# Patient Record
Sex: MALE | Race: OTHER | HISPANIC OR LATINO | Employment: FULL TIME | URBAN - METROPOLITAN AREA
[De-identification: names, ages, dates, MRNs, and addresses within clinical notes are randomized per-mention and may not be internally consistent; named-entity substitution may affect disease eponyms.]

---

## 2020-12-11 ENCOUNTER — OFFICE VISIT (OUTPATIENT)
Dept: URGENT CARE | Facility: MEDICAL CENTER | Age: 35
End: 2020-12-11
Payer: COMMERCIAL

## 2020-12-11 VITALS — TEMPERATURE: 98.6 F | OXYGEN SATURATION: 99 % | HEART RATE: 66 BPM | RESPIRATION RATE: 18 BRPM

## 2020-12-11 DIAGNOSIS — R43.0 NO SENSE OF SMELL: Primary | ICD-10-CM

## 2020-12-11 PROCEDURE — U0003 INFECTIOUS AGENT DETECTION BY NUCLEIC ACID (DNA OR RNA); SEVERE ACUTE RESPIRATORY SYNDROME CORONAVIRUS 2 (SARS-COV-2) (CORONAVIRUS DISEASE [COVID-19]), AMPLIFIED PROBE TECHNIQUE, MAKING USE OF HIGH THROUGHPUT TECHNOLOGIES AS DESCRIBED BY CMS-2020-01-R: HCPCS | Performed by: PHYSICIAN ASSISTANT

## 2020-12-11 PROCEDURE — 99213 OFFICE O/P EST LOW 20 MIN: CPT | Performed by: PHYSICIAN ASSISTANT

## 2020-12-12 LAB — SARS-COV-2 RNA SPEC QL NAA+PROBE: DETECTED

## 2021-08-18 ENCOUNTER — OFFICE VISIT (OUTPATIENT)
Dept: URGENT CARE | Age: 36
End: 2021-08-18
Payer: COMMERCIAL

## 2021-08-18 VITALS
OXYGEN SATURATION: 99 % | HEART RATE: 80 BPM | RESPIRATION RATE: 17 BRPM | WEIGHT: 270 LBS | BODY MASS INDEX: 35.78 KG/M2 | TEMPERATURE: 97.9 F | HEIGHT: 73 IN

## 2021-08-18 DIAGNOSIS — R19.7 DIARRHEA, UNSPECIFIED TYPE: ICD-10-CM

## 2021-08-18 DIAGNOSIS — R05.9 COUGH: Primary | ICD-10-CM

## 2021-08-18 DIAGNOSIS — R11.2 NON-INTRACTABLE VOMITING WITH NAUSEA, UNSPECIFIED VOMITING TYPE: ICD-10-CM

## 2021-08-18 PROCEDURE — 99213 OFFICE O/P EST LOW 20 MIN: CPT | Performed by: PHYSICIAN ASSISTANT

## 2021-08-18 PROCEDURE — U0005 INFEC AGEN DETEC AMPLI PROBE: HCPCS | Performed by: PHYSICIAN ASSISTANT

## 2021-08-18 PROCEDURE — U0003 INFECTIOUS AGENT DETECTION BY NUCLEIC ACID (DNA OR RNA); SEVERE ACUTE RESPIRATORY SYNDROME CORONAVIRUS 2 (SARS-COV-2) (CORONAVIRUS DISEASE [COVID-19]), AMPLIFIED PROBE TECHNIQUE, MAKING USE OF HIGH THROUGHPUT TECHNOLOGIES AS DESCRIBED BY CMS-2020-01-R: HCPCS | Performed by: PHYSICIAN ASSISTANT

## 2021-08-18 RX ORDER — ONDANSETRON 4 MG/1
4 TABLET, ORALLY DISINTEGRATING ORAL EVERY 6 HOURS PRN
Qty: 20 TABLET | Refills: 0 | Status: SHIPPED | OUTPATIENT
Start: 2021-08-18

## 2021-08-18 NOTE — PATIENT INSTRUCTIONS
COVID testing was obtained today  Isolate until test results are available  You may view your results on MyChart  If you do not have a MyChart, you can create one with the activation code you were given today  If results are negative and feeling well, may resume normal activities  If results are positive, will need to isolate for 10-14 days from onset of symptoms  Any worsening of symptoms especially any difficulty breathing go to the emergency room  Follow-up with your family doctor for further treatment if needed  You may take vitamin-D3 2000 IU daily                          vitamin-C 1 gram every 12 hours                         Multivitamin daily   take Zofran as needed for nausea, take Imodium as needed for diarrhea, any worsening of symptoms go to the ER  Acute Nausea and Vomiting   WHAT YOU NEED TO KNOW:   Acute nausea and vomiting start suddenly, worsen quickly, and last a short time  DISCHARGE INSTRUCTIONS:   Return to the emergency department if:   · You see blood in your vomit or your bowel movements  · You have sudden, severe pain in your chest and upper abdomen after hard vomiting or retching  · You have swelling in your neck and chest      · You are dizzy, cold, and thirsty and your eyes and mouth are dry  · You are urinating very little or not at all  · You have muscle weakness, leg cramps, and trouble breathing  · Your heart is beating much faster than normal      · You continue to vomit for more than 48 hours  Contact your healthcare provider if:   · You have frequent dry heaves (vomiting but nothing comes out)  · Your nausea and vomiting does not get better or go away after you use medicine  · You have questions or concerns about your condition or treatment  Medicines: You may need any of the following:  · Medicines  may be given to calm your stomach and stop your vomiting   You may also need medicines to help you feel more relaxed or to stop nausea and vomiting caused by motion sickness  · Gastrointestinal stimulants  are used to help empty your stomach and bowels  This may help decrease nausea and vomiting  · Take your medicine as directed  Contact your healthcare provider if you think your medicine is not helping or if you have side effects  Tell him or her if you are allergic to any medicine  Keep a list of the medicines, vitamins, and herbs you take  Include the amounts, and when and why you take them  Bring the list or the pill bottles to follow-up visits  Carry your medicine list with you in case of an emergency  Prevent or manage acute nausea and vomiting:   · Do not drink alcohol  Alcohol may upset or irritate your stomach  Too much alcohol can also cause acute nausea and vomiting  · Control stress  Headaches due to stress may cause nausea and vomiting  Find ways to relax and manage your stress  Get more rest and sleep  · Drink more liquids as directed  Vomiting can lead to dehydration  It is important to drink more liquids to help replace lost body fluids  Ask your healthcare provider how much liquid to drink each day and which liquids are best for you  Your provider may recommend that you drink an oral rehydration solution (ORS)  ORS contains water, salts, and sugar that are needed to replace the lost body fluids  Ask what kind of ORS to use, how much to drink, and where to get it  · Eat smaller meals, more often  Eat small amounts of food every 2 to 3 hours, even if you are not hungry  Food in your stomach may decrease your nausea  · Talk to your healthcare provider before you take over-the-counter (OTC) medicines  These medicines can cause serious problems if you use certain other medicines, or you have a medical condition  You may have problems if you use too much or use them for longer than the label says  Follow directions on the label carefully      Follow up with your healthcare provider as directed:  Write down your

## 2021-08-18 NOTE — LETTER
August 18, 2021     Patient: Alexey Houston   YOB: 1985   Date of Visit: 8/18/2021       To Whom It May Concern: It is my medical opinion that Alexey Houston  should remain out of work until test results are available  If you have any questions or concerns, please don't hesitate to call           Sincerely,        Anusha Schneider PA-C    CC: No Recipients

## 2021-08-18 NOTE — PROGRESS NOTES
[facility]        NAME: Dyan Mcintosh is a 37 y o  male  : 1985    MRN: [id number]  DATE: 2021  TIME: 11:08 AM    Assessment and Plan   Cough [R05]  1  Cough  [facility]   2  Diarrhea, unspecified type     3  Non-intractable vomiting with nausea, unspecified vomiting type  ondansetron (ZOFRAN-ODT) 4 mg disintegrating tablet         Patient Instructions     Follow up with PCP in 3-5 days  Proceed to  ER if symptoms worsen  Chief Complaint     Chief Complaint   Patient presents with    Nasal Congestion     x2 days ago, pt started with nausea, diarrhea, cough and nasal congestion  pt is not vaccinated  History of Present Illness         79-year-old male presents for possible COVID symptoms  Patient states on Friday he was working in the heat and became weak and dizzy  He states he sat for a period of time and had water and felt better  After his symptoms subsided, he states he felt better  He denies at the time having any loss of consciousness, headache, chest pain, shortness of breath, heart palpitations  Patient states he was feeling well up until Monday when he developed nasal congestion, diarrhea and yesterday developed nausea and vomiting  Patient states he has not COVID vaccinated  He states he had COVID approximately 5 months ago  He is concerned because he states when he had COVID the last time his symptoms were similar to this  Review of Systems   Review of Systems   Constitutional: Positive for appetite change and fatigue  Negative for activity change, chills, diaphoresis and fever  HENT: Positive for congestion  Negative for ear discharge, ear pain, postnasal drip, rhinorrhea, sinus pressure, sinus pain, sneezing, sore throat and trouble swallowing  Eyes: Negative  Respiratory: Negative  Cardiovascular: Negative  Gastrointestinal: Positive for diarrhea, nausea and vomiting   Negative for abdominal distention, abdominal pain and blood in stool  Skin: Negative  Neurological: Positive for dizziness, weakness and light-headedness  Negative for tremors, seizures, syncope, facial asymmetry, speech difficulty, numbness and headaches  Current Medications       Current Outpatient Medications:     ondansetron (ZOFRAN-ODT) 4 mg disintegrating tablet, Take 1 tablet (4 mg total) by mouth every 6 (six) hours as needed for nausea or vomiting, Disp: 20 tablet, Rfl: 0    Current Allergies     Allergies as of 08/18/2021    (No Known Allergies)            The following portions of the patient's history were reviewed and updated as appropriate: allergies, current medications, past family history, past medical history, past social history, past surgical history and problem list     Objective   Pulse 80   Temp 97 9 °F (36 6 °C) (Tympanic)   Resp 17   Ht 6' 1" (1 854 m)   Wt 122 kg (270 lb)   SpO2 99%   BMI 35 62 kg/m²        Physical Exam     Physical Exam  Vitals and nursing note reviewed  Constitutional:       General: He is not in acute distress  Appearance: He is not ill-appearing  HENT:      Head: Normocephalic and atraumatic  Right Ear: Tympanic membrane, ear canal and external ear normal       Left Ear: Tympanic membrane, ear canal and external ear normal       Nose: Nose normal       Mouth/Throat:      Mouth: Mucous membranes are moist       Pharynx: No posterior oropharyngeal erythema  Eyes:      Conjunctiva/sclera: Conjunctivae normal    Cardiovascular:      Rate and Rhythm: Normal rate and regular rhythm  Pulmonary:      Effort: Pulmonary effort is normal       Breath sounds: Normal breath sounds  Abdominal:      General: Abdomen is protuberant  Bowel sounds are normal       Palpations: Abdomen is soft  Tenderness: There is no abdominal tenderness  There is no guarding or rebound  Lymphadenopathy:      Cervical: No cervical adenopathy     Neurological:      Mental Status: He is alert

## 2021-08-19 LAB — SARS-COV-2 RNA RESP QL NAA+PROBE: NEGATIVE

## 2024-02-14 ENCOUNTER — APPOINTMENT (EMERGENCY)
Dept: RADIOLOGY | Facility: HOSPITAL | Age: 39
End: 2024-02-14

## 2024-02-14 ENCOUNTER — HOSPITAL ENCOUNTER (EMERGENCY)
Facility: HOSPITAL | Age: 39
Discharge: HOME | End: 2024-02-14
Attending: EMERGENCY MEDICINE | Admitting: EMERGENCY MEDICINE

## 2024-02-14 VITALS
BODY MASS INDEX: 41.08 KG/M2 | SYSTOLIC BLOOD PRESSURE: 145 MMHG | WEIGHT: 311.4 LBS | TEMPERATURE: 97.8 F | RESPIRATION RATE: 16 BRPM | HEART RATE: 84 BPM | DIASTOLIC BLOOD PRESSURE: 91 MMHG | OXYGEN SATURATION: 99 %

## 2024-02-14 DIAGNOSIS — M54.9 BACK PAIN: ICD-10-CM
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
## 2024-02-14 DIAGNOSIS — S60.419A ABRASION OF FINGER, INITIAL ENCOUNTER: ICD-10-CM

## 2024-02-14 LAB
BILIRUB UR QL STRIP: NEGATIVE
CLARITY UR: CLEAR
COLOR UR: YELLOW
GLUCOSE UR STRIP-MCNC: NEGATIVE MG/DL
HGB UR QL STRIP.AUTO: NEGATIVE
KETONES UR STRIP-MCNC: NEGATIVE MG/DL
LEUKOCYTE ESTERASE UR QL STRIP: NEGATIVE
NITRITE UR QL STRIP: NEGATIVE
PH UR STRIP.AUTO: 5 [PH]
PROT UR STRIP-MCNC: NEGATIVE MG/DL
SP GR UR STRIP.AUTO: 1.01 (ref 1–1.04)
UROBILINOGEN UA: NEGATIVE MG/DL

## 2024-02-14 PROCEDURE — 90715 TDAP VACCINE 7 YRS/> IM: CPT

## 2024-02-14 PROCEDURE — 90471 IMMUNIZATION ADMIN: CPT

## 2024-02-14 PROCEDURE — 72100 X-RAY EXAM L-S SPINE 2/3 VWS: CPT

## 2024-02-14 PROCEDURE — 99284 EMERGENCY DEPT VISIT MOD MDM: CPT

## 2024-02-14 PROCEDURE — 96372 THER/PROPH/DIAG INJ SC/IM: CPT

## 2024-02-14 RX ORDER — LIDOCAINE 50 MG/G
1 PATCH TOPICAL ONCE
Status: DISCONTINUED | OUTPATIENT
Start: 2024-02-14 | End: 2024-02-14 | Stop reason: HOSPADM

## 2024-02-14 RX ORDER — LIDOCAINE 50 MG/G
1 PATCH TOPICAL EVERY 24 HOURS
Qty: 15 PATCH | Refills: 0 | Status: SHIPPED | OUTPATIENT
Start: 2024-02-14 | End: 2024-02-29

## 2024-02-14 RX ORDER — NAPROXEN 500 MG/1
500 TABLET ORAL 2 TIMES DAILY WITH MEALS
Qty: 10 TABLET | Refills: 0 | Status: SHIPPED | OUTPATIENT
Start: 2024-02-14 | End: 2025-02-13

## 2024-02-14 RX ORDER — KETOROLAC TROMETHAMINE 30 MG/ML
15 INJECTION, SOLUTION INTRAMUSCULAR; INTRAVENOUS ONCE
Status: COMPLETED | OUTPATIENT
Start: 2024-02-14 | End: 2024-02-14

## 2024-02-14 RX ORDER — GINSENG 100 MG
1 CAPSULE ORAL ONCE
Status: COMPLETED | OUTPATIENT
Start: 2024-02-14 | End: 2024-02-14

## 2024-02-14 RX ORDER — METHOCARBAMOL 500 MG/1
500 TABLET, FILM COATED ORAL 2 TIMES DAILY
Qty: 20 TABLET | Refills: 0 | Status: SHIPPED | OUTPATIENT
Start: 2024-02-14

## 2024-02-14 RX ORDER — METHOCARBAMOL 500 MG/1
500 TABLET, FILM COATED ORAL ONCE
Status: COMPLETED | OUTPATIENT
Start: 2024-02-14 | End: 2024-02-14

## 2024-02-14 RX ADMIN — TETANUS TOXOID, REDUCED DIPHTHERIA TOXOID AND ACELLULAR PERTUSSIS VACCINE, ADSORBED 0.5 ML: 5; 2.5; 8; 8; 2.5 SUSPENSION INTRAMUSCULAR at 12:16

## 2024-02-14 RX ADMIN — BACITRACIN 1 SMALL APPLICATION: 500 OINTMENT TOPICAL at 12:16

## 2024-02-14 RX ADMIN — METHOCARBAMOL TABLETS 500 MG: 500 TABLET, COATED ORAL at 12:17

## 2024-02-14 RX ADMIN — LIDOCAINE 1 PATCH: 50 PATCH CUTANEOUS at 12:17

## 2024-02-14 RX ADMIN — KETOROLAC TROMETHAMINE 15 MG: 30 INJECTION, SOLUTION INTRAMUSCULAR; INTRAVENOUS at 12:16

## 2024-02-14 NOTE — Clinical Note
Santhosh Berg was seen and treated in our emergency department on 2/14/2024.                Diagnosis:     Santhosh  .    He may return on this date: 02/17/2024         If you have any questions or concerns, please don't hesitate to call.      Chaya Sanchez PA-C    ______________________________           _______________          _______________  Hospital Representative                              Date                                Time

## 2024-02-14 NOTE — DISCHARGE INSTRUCTIONS
Do not drive or operate heavy machinery while taking robaxin. Follow wound care instructions discussed.  Follow-up with PCP.  Follow-up with comprehensive spine program as needed.  Return to ED for new or worsening symptoms as discussed

## 2024-02-14 NOTE — ED PROVIDER NOTES
History  Chief Complaint   Patient presents with    Fall     Pt slipped down stairs at home, c/o pain to lower back, right leg and right hand.  Several abrasions noted to fingers of right hand.  No head injury, no LOC     38-year-old male past medical history of hypertension presents to emergency department complaining of low back pain after fall.  Was walking out of his apartment when he slipped on ice down the 2 concrete steps at the front of his porch.  No head strike no loss of consciousness.  Witnessed by wife.  Was able to get up by himself.  Ambulated here.  Back pain worse with standing.  Also has abrasions to the third and fourth digits of her right hand. denies numbness, weakness, saddle anesthesia, urinary incontinence, fecal incontinence, bruising, swelling, erythema, decreased range of motion, nausea, vomiting, abdominal pain, dizziness, shortness of breath.      History provided by:  Patient  Fall  Arrived directly from scene: yes    Tetanus status:  Unknown  Associated symptoms: back pain    Associated symptoms: no abdominal pain, no blindness, no chest pain, no difficulty breathing, no headaches, no hearing loss, no loss of consciousness, no nausea, no neck pain, no seizures and no vomiting        Prior to Admission Medications   Prescriptions Last Dose Informant Patient Reported? Taking?   ondansetron (ZOFRAN-ODT) 4 mg disintegrating tablet   No No   Sig: Take 1 tablet (4 mg total) by mouth every 6 (six) hours as needed for nausea or vomiting      Facility-Administered Medications: None       Past Medical History:   Diagnosis Date    Hypertension        History reviewed. No pertinent surgical history.    History reviewed. No pertinent family history.  I have reviewed and agree with the history as documented.    E-Cigarette/Vaping     E-Cigarette/Vaping Substances     Social History     Tobacco Use    Smoking status: Some Days    Smokeless tobacco: Never   Substance Use Topics    Alcohol use: Not  Currently    Drug use: Not Currently     Types: Marijuana       Review of Systems   Constitutional:  Negative for chills, diaphoresis and fever.   HENT:  Negative for hearing loss.    Eyes:  Negative for blindness and visual disturbance.   Respiratory:  Negative for shortness of breath.    Cardiovascular:  Negative for chest pain.   Gastrointestinal:  Negative for abdominal distention, abdominal pain, nausea and vomiting.   Genitourinary:  Negative for decreased urine volume and hematuria.   Musculoskeletal:  Positive for back pain. Negative for gait problem, joint swelling and neck pain.   Skin:  Positive for wound. Negative for color change and rash.   Neurological:  Negative for dizziness, seizures, loss of consciousness, syncope, weakness and headaches.   Psychiatric/Behavioral:  Negative for confusion.    All other systems reviewed and are negative.      Physical Exam  Physical Exam  Vitals and nursing note reviewed.   Constitutional:       General: He is awake. He is not in acute distress.     Appearance: Normal appearance. He is not ill-appearing.   HENT:      Head: Normocephalic and atraumatic.      Mouth/Throat:      Lips: Pink.      Mouth: Mucous membranes are moist.   Eyes:      General: Vision grossly intact.      Pupils: Pupils are equal, round, and reactive to light.   Cardiovascular:      Rate and Rhythm: Normal rate and regular rhythm.      Pulses:           Radial pulses are 2+ on the right side and 2+ on the left side.        Dorsalis pedis pulses are 2+ on the right side and 2+ on the left side.      Heart sounds: Normal heart sounds.   Pulmonary:      Effort: Pulmonary effort is normal. No respiratory distress.      Breath sounds: Normal breath sounds.   Abdominal:      General: There is no distension.      Palpations: Abdomen is soft.      Tenderness: There is no abdominal tenderness.   Musculoskeletal:         General: No swelling or deformity.        Hands:       Cervical back: Normal and neck  supple.      Thoracic back: Normal.      Lumbar back: Tenderness present. No swelling. Normal range of motion.        Back:       Right hip: Normal.      Left hip: Normal.   Skin:     General: Skin is warm and dry.      Capillary Refill: Capillary refill takes less than 2 seconds.      Findings: No bruising, erythema or rash.   Neurological:      Mental Status: He is alert and oriented to person, place, and time.      Sensory: No sensory deficit.      Motor: No weakness.      Gait: Gait normal.   Psychiatric:         Mood and Affect: Mood normal.         Behavior: Behavior normal.         Vital Signs  ED Triage Vitals [02/14/24 1140]   Temperature Pulse Respirations Blood Pressure SpO2   97.8 °F (36.6 °C) (!) 114 16 (!) 159/102 96 %      Temp Source Heart Rate Source Patient Position - Orthostatic VS BP Location FiO2 (%)   Tympanic Monitor Sitting Left arm --      Pain Score       --           Vitals:    02/14/24 1140 02/14/24 1343   BP: (!) 159/102 145/91   Pulse: (!) 114 84   Patient Position - Orthostatic VS: Sitting Lying         Visual Acuity      ED Medications  Medications   tetanus-diphtheria-acellular pertussis (BOOSTRIX) IM injection 0.5 mL (0.5 mL Intramuscular Given 2/14/24 1216)   bacitracin topical ointment 1 small application (1 small application Topical Given 2/14/24 1216)   ketorolac (TORADOL) injection 15 mg (15 mg Intramuscular Given 2/14/24 1216)   methocarbamol (ROBAXIN) tablet 500 mg (500 mg Oral Given 2/14/24 1217)       Diagnostic Studies  Results Reviewed       Procedure Component Value Units Date/Time    UA (URINE) with reflex to Scope [[id number]]  (Normal) Collected: 02/14/24 1216    Lab Status: Final result Specimen: Urine, Clean Catch Updated: 02/14/24 1240     Color, UA Yellow     Clarity, UA Clear     Specific Gravity, UA 1.015     pH, UA 5.0     Leukocytes, UA Negative     Nitrite, UA Negative     Protein, UA Negative mg/dl      Glucose, UA Negative mg/dl      Ketones, UA Negative  "mg/dl      Bilirubin, UA Negative     Occult Blood, UA Negative     UROBILINOGEN UA Negative mg/dL                    XR lumbar spine 2 or 3 views   ED Interpretation by Chaya Sanchez PA-C (02/14 1323)   No acute fracture of traumatic misalignment       Final Result by Lazarus Mckeon MD (02/14 1549)      No acute osseous abnormality.         Workstation performed: [id number]                    Procedures  Procedures         ED Course  ED Course as of 02/14/24 1639   Wed Feb 14, 2024   1323 Imaging review done by myself and preliminary results were discussed with the patient and family members.  Discussion was had with patient and family members that this read is preliminary and therefore discrepancies between this read and the final read by the radiologist are possible.  Patient and family members were informed that any discrepancies found by would be relayed by phone call.                                                  Medical Decision Making  Fall. Areas of bony tenderness imaged.  Neurovascularly intact.  No hx or exam findings concerning for cauda equina.  No acute fracture or traumatic misalignment on wet read of x-ray.  No hematuria.  Plan for supportive care, comprehensive spine follow-up.    All imaging and/or lab testing discussed with patient, strict return to ED precautions discussed. Patient recommended to follow up promptly with appropriate outpatient provider. Patient and/or family members verbalizes understanding and agrees with plan. Patient and/or family members were given opportunity to ask questions, all questions were answered at this time. Patient is stable for discharge.     Portions of the record may have been created with voice recognition software. Occasional wrong word or \"sound a like\" substitutions may have occurred due to the inherent limitations of voice recognition software. Read the chart carefully and recognize, using context, where substitutions have occurred.       Amount " and/or Complexity of Data Reviewed  Labs: ordered.  Radiology: ordered and independent interpretation performed.    Risk  OTC drugs.  Prescription drug management.             Disposition  Final diagnoses:   Fall, initial encounter   Back pain   Abrasion of finger, initial encounter     Time reflects when diagnosis was documented in both MDM as applicable and the Disposition within this note       Time User Action Codes Description Comment    2/14/2024  1:26 PM Chaya Sanchez [W19.XXXA] Fall, initial encounter     2/14/2024  1:26 PM Chaya Sanchez [M54.9] Back pain     2/14/2024  1:27 PM Chaya Sanchez [S60.419A] Abrasion of finger, initial encounter           ED Disposition       ED Disposition   Discharge    Condition   Stable    Date/Time   Wed Feb 14, 2024  1:27 PM    Comment   Santhosh Berg discharge to home/self care.                   Follow-up Information       Follow up With Specialties Details Why Contact Info Additional Information    [facility] Schedule an appointment as soon as possible for a visit  For follow up regarding your symptoms [address] [place] [id number] [phone] [facility], [address] [place] [address], [place], [place], [address] [phone]            Discharge Medication List as of 2/14/2024  1:49 PM        START taking these medications    Details   Diclofenac Sodium (VOLTAREN) 1 % Apply 2 g topically 4 (four) times a day, Starting Wed 2/14/2024, Normal      lidocaine (LIDODERM) 5 % Apply 1 patch topically over 12 hours every 24 hours for 15 days Remove & Discard patch within 12 hours or as directed by MD, Starting Wed 2/14/2024, Until u 2/29/2024, Normal      methocarbamol (ROBAXIN) 500 mg tablet Take 1 tablet (500 mg total) by mouth 2 (two) times a day, Starting Wed 2/14/2024, Normal      naproxen (EC NAPROSYN) 500 MG EC tablet Take 1 tablet (500 mg  total) by mouth 2 (two) times a day with meals, Starting Wed 2/14/2024, Until Thu 2/13/2025, Normal           CONTINUE these medications which have NOT CHANGED    Details   ondansetron (ZOFRAN-ODT) 4 mg disintegrating tablet Take 1 tablet (4 mg total) by mouth every 6 (six) hours as needed for nausea or vomiting, Starting Wed 8/18/2021, Normal                 PDMP Review       None            ED Provider  Electronically Signed by             Chaya Sanchez PA-C  02/14/24 [id number]

## 2024-02-15 ENCOUNTER — TELEPHONE (OUTPATIENT)
Dept: PHYSICAL THERAPY | Facility: OTHER | Age: 39
End: 2024-02-15

## 2024-02-15 NOTE — TELEPHONE ENCOUNTER
Call placed to the patient per [facility] referral.    Spoke with the patient who states his pain is a bit better. He has to  his medications yet. Will start to take his medication, and if pain does not go away he will call us back    H.I???    Comp spine closed

## 2024-11-18 ENCOUNTER — HOSPITAL ENCOUNTER (OUTPATIENT)
Dept: NEUROLOGY | Facility: CLINIC | Age: 39
Discharge: HOME | End: 2024-11-18
Payer: COMMERCIAL

## 2024-11-18 DIAGNOSIS — M54.16 RADICULOPATHY, LUMBAR REGION: ICD-10-CM

## 2024-11-18 PROBLEM — M54.17 LUMBOSACRAL RADICULOPATHY: Status: ACTIVE | Noted: 2024-11-18

## 2024-11-18 PROCEDURE — 95911 NRV CNDJ TEST 9-10 STUDIES: CPT | Performed by: PSYCHIATRY & NEUROLOGY

## 2024-11-18 PROCEDURE — 95886 MUSC TEST DONE W/N TEST COMP: CPT | Performed by: PSYCHIATRY & NEUROLOGY
